# Patient Record
Sex: FEMALE | Race: WHITE | NOT HISPANIC OR LATINO | Employment: PART TIME | URBAN - METROPOLITAN AREA
[De-identification: names, ages, dates, MRNs, and addresses within clinical notes are randomized per-mention and may not be internally consistent; named-entity substitution may affect disease eponyms.]

---

## 2024-01-17 ENCOUNTER — HOSPITAL ENCOUNTER (OUTPATIENT)
Dept: CARDIOLOGY | Facility: CLINIC | Age: 24
Discharge: HOME | End: 2024-01-17
Payer: COMMERCIAL

## 2024-01-17 ENCOUNTER — OFFICE VISIT (OUTPATIENT)
Dept: CARDIOLOGY | Facility: CLINIC | Age: 24
End: 2024-01-17
Payer: COMMERCIAL

## 2024-01-17 VITALS
HEIGHT: 67 IN | BODY MASS INDEX: 30.26 KG/M2 | DIASTOLIC BLOOD PRESSURE: 75 MMHG | HEART RATE: 70 BPM | TEMPERATURE: 98.1 F | SYSTOLIC BLOOD PRESSURE: 116 MMHG | WEIGHT: 192.8 LBS

## 2024-01-17 DIAGNOSIS — R55 SYNCOPE AND COLLAPSE: Primary | ICD-10-CM

## 2024-01-17 DIAGNOSIS — R55 SYNCOPE AND COLLAPSE: ICD-10-CM

## 2024-01-17 PROCEDURE — 1036F TOBACCO NON-USER: CPT | Performed by: INTERNAL MEDICINE

## 2024-01-17 PROCEDURE — 99204 OFFICE O/P NEW MOD 45 MIN: CPT | Performed by: INTERNAL MEDICINE

## 2024-01-17 PROCEDURE — 99214 OFFICE O/P EST MOD 30 MIN: CPT | Performed by: INTERNAL MEDICINE

## 2024-01-17 PROCEDURE — 93242 EXT ECG>48HR<7D RECORDING: CPT

## 2024-01-17 PROCEDURE — 93244 EXT ECG>48HR<7D REV&INTERPJ: CPT | Performed by: INTERNAL MEDICINE

## 2024-01-17 RX ORDER — FLUOCINONIDE 0.5 MG/G
CREAM TOPICAL 2 TIMES DAILY
COMMUNITY
Start: 2023-06-08

## 2024-01-17 RX ORDER — HYDROXYZINE HYDROCHLORIDE 25 MG/1
25 TABLET, FILM COATED ORAL 3 TIMES DAILY PRN
COMMUNITY
Start: 2023-10-31

## 2024-01-17 RX ORDER — TAZAROTENE 0.45 MG/G
LOTION TOPICAL
COMMUNITY
Start: 2024-01-10

## 2024-01-17 RX ORDER — BUSPIRONE HYDROCHLORIDE 15 MG/1
15 TABLET ORAL 2 TIMES DAILY
COMMUNITY
Start: 2023-10-24

## 2024-01-17 RX ORDER — PRAZOSIN HYDROCHLORIDE 1 MG/1
1 CAPSULE ORAL NIGHTLY
COMMUNITY
Start: 2023-10-31 | End: 2024-01-17 | Stop reason: SINTOL

## 2024-01-17 RX ORDER — SPIRONOLACTONE 50 MG/1
TABLET, FILM COATED ORAL
COMMUNITY
Start: 2024-01-04

## 2024-01-17 RX ORDER — FLUOXETINE HYDROCHLORIDE 40 MG/1
80 CAPSULE ORAL DAILY
COMMUNITY

## 2024-01-17 NOTE — PROGRESS NOTES
"Chief Complaint:   Syncope     History of Present Illness     Kylee Carson is a 23 y.o. female presenting with syncope.  She had a syncopal episode at age 15 Dx with \"anxiety attack.\" On phone and had syncope - does not recall details.  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  When she leans down she experiences black and white flashes with blind spot, feels warm, heart races, then after a few minutes she had syncope a two times over the past year.  Syncope x 1 in hot shower.  Relieved by straightening neck and sitting down. Also gets these symptoms with sitting to standing.  On Spironolactone x 1 year and Prazosin (for night terrors) x 6 mos.  No cardiac Hx. No FHX cardiac disease or SCD. Exercises - no CP.  No JIMENES.       Previous History     Past Medical History:  She has a past medical history of Syncope and collapse (01/17/2024).    Past Surgical History:  She has no past surgical history on file.      Social History:  She reports that she has never smoked. She has never used smokeless tobacco. She reports current alcohol use. She reports that she does not use drugs.    Family History:  No family history on file.     Allergies:  Penicillins and Sulfa (sulfonamide antibiotics)    Outpatient Medications:  Current Outpatient Medications   Medication Instructions    Arazlo 0.045 % lotion     busPIRone (BUSPAR) 15 mg, oral, 2 times daily    fluocinonide 0.05 % cream Topical, 2 times daily, to affected area    FLUoxetine (PROZAC) 80 mg, oral, Daily    hydrOXYzine HCL (ATARAX) 25 mg, oral, 3 times daily PRN    prazosin (MINIPRESS) 1 mg, oral, Nightly    spironolactone (Aldactone) 50 mg tablet        Physical Examination   Vitals:  Visit Vitals  /75   Pulse 70   Temp 36.7 °C (98.1 °F)   Ht 1.702 m (5' 7\")   Wt 87.5 kg (192 lb 12.8 oz)   BMI 30.20 kg/m²   Smoking Status Never   BSA 2.03 m²    Physical Exam  Vitals reviewed.   Constitutional:       General: She is not in acute distress.     Appearance: Normal " "appearance.   HENT:      Head: Normocephalic and atraumatic.      Nose: Nose normal.   Eyes:      Conjunctiva/sclera: Conjunctivae normal.   Cardiovascular:      Rate and Rhythm: Normal rate and regular rhythm.      Pulses: Normal pulses.      Heart sounds: No murmur heard.  Pulmonary:      Effort: Pulmonary effort is normal. No respiratory distress.      Breath sounds: Normal breath sounds. No wheezing, rhonchi or rales.   Abdominal:      General: Bowel sounds are normal. There is no distension.      Palpations: Abdomen is soft.      Tenderness: There is no abdominal tenderness.   Musculoskeletal:         General: No swelling.      Right lower leg: No edema.      Left lower leg: No edema.   Skin:     General: Skin is warm and dry.      Capillary Refill: Capillary refill takes less than 2 seconds.   Neurological:      General: No focal deficit present.      Mental Status: She is alert.   Psychiatric:         Mood and Affect: Mood normal.             Labs/Imaging/Cardiac Studies     Last Labs:  CBC -  No results found for: \"WBC\", \"HGB\", \"HCT\", \"MCV\", \"PLT\"    CMP -  No results found for: \"CALCIUM\", \"PHOS\", \"PROT\", \"ALBUMIN\", \"AST\", \"ALT\", \"ALKPHOS\", \"BILITOT\"    LIPID PANEL -   No results found for: \"CHOL\", \"HDL\", \"CHHDL\", \"LDL\", \"VLDL\", \"TRIG\", \"NHDL\"    RENAL FUNCTION PANEL -   No results found for: \"GLU\", \"K\", \"CHLOR\", \"PHOS\"    No results found for: \"BNP\", \"HGBA1C\"    ECG:    Echo:  No echocardiogram results found for the past 12 months       Assessment and Recommendations     Assessment/Plan   1. Syncope and collapse  I suspect that as she had no syncope x 8 years until she was Rx Prazosin and Spironolactone, she is getting hypotensive causing syncope.  Stop Prazosin and if not better then decrease spironolactone. Echo and 7-day Holter.       Fernie Feng MD    Exclusive of any other services or procedures performed, I, Fernie Feng MD , spent 30 minutes in duration for this visit today.  This time consisted " of chart review, obtaining history, and/or performing the exam as documented above as well as documenting the clinical information for the encounter in the electronic record, discussing treatment options, plans, and/or goals with patient, family, and/or caregiver, refilling medications, updating the electronic record, ordering medicines, lab work, imaging, referrals, and/or procedures as documented above and communicating with other [facility] professionals. I have discussed the results of laboratory, radiology, and cardiology studies with the patient and their family/caregiver.

## 2024-01-18 ENCOUNTER — APPOINTMENT (OUTPATIENT)
Dept: CARDIOLOGY | Facility: CLINIC | Age: 24
End: 2024-01-18
Payer: COMMERCIAL

## 2024-01-31 ENCOUNTER — TELEPHONE (OUTPATIENT)
Dept: CARDIOLOGY | Facility: CLINIC | Age: 24
End: 2024-01-31
Payer: COMMERCIAL

## 2024-02-01 LAB — BODY SURFACE AREA: 2.03 M2

## 2024-02-05 ENCOUNTER — APPOINTMENT (OUTPATIENT)
Dept: CARDIOLOGY | Facility: CLINIC | Age: 24
End: 2024-02-05
Payer: COMMERCIAL

## 2024-02-12 ENCOUNTER — TELEPHONE (OUTPATIENT)
Dept: CARDIOLOGY | Facility: CLINIC | Age: 24
End: 2024-02-12
Payer: COMMERCIAL